# Patient Record
Sex: MALE | Race: WHITE | Employment: FULL TIME | URBAN - NONMETROPOLITAN AREA
[De-identification: names, ages, dates, MRNs, and addresses within clinical notes are randomized per-mention and may not be internally consistent; named-entity substitution may affect disease eponyms.]

---

## 2023-08-22 ENCOUNTER — HOSPITAL ENCOUNTER (INPATIENT)
Age: 53
LOS: 1 days | Discharge: NURSING, REHAB OR LONG-TERM CARE | DRG: 145 | End: 2023-08-24
Attending: DENTIST | Admitting: DENTIST
Payer: MEDICARE

## 2023-08-22 ENCOUNTER — ANESTHESIA (OUTPATIENT)
Dept: OPERATING ROOM | Age: 53
End: 2023-08-22
Payer: MEDICARE

## 2023-08-22 ENCOUNTER — ANESTHESIA EVENT (OUTPATIENT)
Dept: OPERATING ROOM | Age: 53
End: 2023-08-22
Payer: MEDICARE

## 2023-08-22 DIAGNOSIS — K02.9 DECAY, TEETH: Primary | ICD-10-CM

## 2023-08-22 PROCEDURE — 2500000003 HC RX 250 WO HCPCS: Performed by: NURSE ANESTHETIST, CERTIFIED REGISTERED

## 2023-08-22 PROCEDURE — 2709999900 HC NON-CHARGEABLE SUPPLY: Performed by: DENTIST

## 2023-08-22 PROCEDURE — G0378 HOSPITAL OBSERVATION PER HR: HCPCS

## 2023-08-22 PROCEDURE — C9399 UNCLASSIFIED DRUGS OR BIOLOG: HCPCS | Performed by: NURSE ANESTHETIST, CERTIFIED REGISTERED

## 2023-08-22 PROCEDURE — 3600000002 HC SURGERY LEVEL 2 BASE: Performed by: DENTIST

## 2023-08-22 PROCEDURE — 2720000010 HC SURG SUPPLY STERILE: Performed by: DENTIST

## 2023-08-22 PROCEDURE — 2580000003 HC RX 258: Performed by: ANESTHESIOLOGY

## 2023-08-22 PROCEDURE — 2500000003 HC RX 250 WO HCPCS: Performed by: ANESTHESIOLOGY

## 2023-08-22 PROCEDURE — 3700000000 HC ANESTHESIA ATTENDED CARE: Performed by: DENTIST

## 2023-08-22 PROCEDURE — 3700000001 HC ADD 15 MINUTES (ANESTHESIA): Performed by: DENTIST

## 2023-08-22 PROCEDURE — 6360000002 HC RX W HCPCS: Performed by: NURSE ANESTHETIST, CERTIFIED REGISTERED

## 2023-08-22 PROCEDURE — 6360000002 HC RX W HCPCS: Performed by: ANESTHESIOLOGY

## 2023-08-22 PROCEDURE — 6360000002 HC RX W HCPCS: Performed by: DENTIST

## 2023-08-22 PROCEDURE — 7100000001 HC PACU RECOVERY - ADDTL 15 MIN: Performed by: DENTIST

## 2023-08-22 PROCEDURE — 3600000012 HC SURGERY LEVEL 2 ADDTL 15MIN: Performed by: DENTIST

## 2023-08-22 PROCEDURE — 7100000000 HC PACU RECOVERY - FIRST 15 MIN: Performed by: DENTIST

## 2023-08-22 RX ORDER — ROCURONIUM BROMIDE 10 MG/ML
INJECTION, SOLUTION INTRAVENOUS PRN
Status: DISCONTINUED | OUTPATIENT
Start: 2023-08-22 | End: 2023-08-22 | Stop reason: SDUPTHER

## 2023-08-22 RX ORDER — HYDROMORPHONE HYDROCHLORIDE 1 MG/ML
0.5 INJECTION, SOLUTION INTRAMUSCULAR; INTRAVENOUS; SUBCUTANEOUS EVERY 5 MIN PRN
Status: DISCONTINUED | OUTPATIENT
Start: 2023-08-22 | End: 2023-08-22 | Stop reason: HOSPADM

## 2023-08-22 RX ORDER — MIDAZOLAM HYDROCHLORIDE 1 MG/ML
INJECTION INTRAMUSCULAR; INTRAVENOUS PRN
Status: DISCONTINUED | OUTPATIENT
Start: 2023-08-22 | End: 2023-08-22 | Stop reason: SDUPTHER

## 2023-08-22 RX ORDER — HYDROMORPHONE HYDROCHLORIDE 1 MG/ML
0.25 INJECTION, SOLUTION INTRAMUSCULAR; INTRAVENOUS; SUBCUTANEOUS EVERY 5 MIN PRN
Status: DISCONTINUED | OUTPATIENT
Start: 2023-08-22 | End: 2023-08-22 | Stop reason: HOSPADM

## 2023-08-22 RX ORDER — SODIUM CHLORIDE 0.9 % (FLUSH) 0.9 %
5-40 SYRINGE (ML) INJECTION PRN
Status: DISCONTINUED | OUTPATIENT
Start: 2023-08-22 | End: 2023-08-22 | Stop reason: HOSPADM

## 2023-08-22 RX ORDER — SODIUM CHLORIDE 0.9 % (FLUSH) 0.9 %
5-40 SYRINGE (ML) INJECTION EVERY 12 HOURS SCHEDULED
Status: DISCONTINUED | OUTPATIENT
Start: 2023-08-22 | End: 2023-08-22 | Stop reason: HOSPADM

## 2023-08-22 RX ORDER — PROPOFOL 10 MG/ML
INJECTION, EMULSION INTRAVENOUS PRN
Status: DISCONTINUED | OUTPATIENT
Start: 2023-08-22 | End: 2023-08-22 | Stop reason: SDUPTHER

## 2023-08-22 RX ORDER — SODIUM CHLORIDE 9 MG/ML
INJECTION, SOLUTION INTRAVENOUS PRN
Status: DISCONTINUED | OUTPATIENT
Start: 2023-08-22 | End: 2023-08-22 | Stop reason: HOSPADM

## 2023-08-22 RX ORDER — HYDROMORPHONE HYDROCHLORIDE 1 MG/ML
1 INJECTION, SOLUTION INTRAMUSCULAR; INTRAVENOUS; SUBCUTANEOUS
Status: DISCONTINUED | OUTPATIENT
Start: 2023-08-22 | End: 2023-08-24 | Stop reason: HOSPADM

## 2023-08-22 RX ORDER — ONDANSETRON 2 MG/ML
4 INJECTION INTRAMUSCULAR; INTRAVENOUS
Status: DISCONTINUED | OUTPATIENT
Start: 2023-08-22 | End: 2023-08-22 | Stop reason: HOSPADM

## 2023-08-22 RX ORDER — LABETALOL HYDROCHLORIDE 5 MG/ML
10 INJECTION, SOLUTION INTRAVENOUS ONCE
Status: COMPLETED | OUTPATIENT
Start: 2023-08-22 | End: 2023-08-22

## 2023-08-22 RX ORDER — DEXAMETHASONE SODIUM PHOSPHATE 10 MG/ML
INJECTION, SOLUTION INTRAMUSCULAR; INTRAVENOUS PRN
Status: DISCONTINUED | OUTPATIENT
Start: 2023-08-22 | End: 2023-08-22 | Stop reason: SDUPTHER

## 2023-08-22 RX ORDER — HYDROMORPHONE HYDROCHLORIDE 1 MG/ML
0.5 INJECTION, SOLUTION INTRAMUSCULAR; INTRAVENOUS; SUBCUTANEOUS
Status: DISCONTINUED | OUTPATIENT
Start: 2023-08-22 | End: 2023-08-24 | Stop reason: HOSPADM

## 2023-08-22 RX ORDER — LIDOCAINE HYDROCHLORIDE 10 MG/ML
INJECTION, SOLUTION EPIDURAL; INFILTRATION; INTRACAUDAL; PERINEURAL PRN
Status: DISCONTINUED | OUTPATIENT
Start: 2023-08-22 | End: 2023-08-22 | Stop reason: SDUPTHER

## 2023-08-22 RX ORDER — HYDROMORPHONE HYDROCHLORIDE 1 MG/ML
0.25 INJECTION, SOLUTION INTRAMUSCULAR; INTRAVENOUS; SUBCUTANEOUS
Status: DISCONTINUED | OUTPATIENT
Start: 2023-08-22 | End: 2023-08-24 | Stop reason: HOSPADM

## 2023-08-22 RX ORDER — SODIUM CHLORIDE, SODIUM LACTATE, POTASSIUM CHLORIDE, CALCIUM CHLORIDE 600; 310; 30; 20 MG/100ML; MG/100ML; MG/100ML; MG/100ML
INJECTION, SOLUTION INTRAVENOUS CONTINUOUS
Status: DISCONTINUED | OUTPATIENT
Start: 2023-08-22 | End: 2023-08-22 | Stop reason: HOSPADM

## 2023-08-22 RX ORDER — ONDANSETRON 2 MG/ML
INJECTION INTRAMUSCULAR; INTRAVENOUS PRN
Status: DISCONTINUED | OUTPATIENT
Start: 2023-08-22 | End: 2023-08-22 | Stop reason: SDUPTHER

## 2023-08-22 RX ORDER — CLINDAMYCIN PHOSPHATE 600 MG/50ML
600 INJECTION INTRAVENOUS EVERY 6 HOURS
Status: DISCONTINUED | OUTPATIENT
Start: 2023-08-22 | End: 2023-08-24 | Stop reason: HOSPADM

## 2023-08-22 RX ORDER — FENTANYL CITRATE 50 UG/ML
INJECTION, SOLUTION INTRAMUSCULAR; INTRAVENOUS PRN
Status: DISCONTINUED | OUTPATIENT
Start: 2023-08-22 | End: 2023-08-22 | Stop reason: SDUPTHER

## 2023-08-22 RX ADMIN — SODIUM CHLORIDE, SODIUM LACTATE, POTASSIUM CHLORIDE, AND CALCIUM CHLORIDE: 600; 310; 30; 20 INJECTION, SOLUTION INTRAVENOUS at 13:58

## 2023-08-22 RX ADMIN — FENTANYL CITRATE 50 MCG: 0.05 INJECTION, SOLUTION INTRAMUSCULAR; INTRAVENOUS at 17:22

## 2023-08-22 RX ADMIN — FENTANYL CITRATE 50 MCG: 0.05 INJECTION, SOLUTION INTRAMUSCULAR; INTRAVENOUS at 16:35

## 2023-08-22 RX ADMIN — SODIUM CHLORIDE, SODIUM LACTATE, POTASSIUM CHLORIDE, AND CALCIUM CHLORIDE: 600; 310; 30; 20 INJECTION, SOLUTION INTRAVENOUS at 16:44

## 2023-08-22 RX ADMIN — ONDANSETRON 4 MG: 2 INJECTION INTRAMUSCULAR; INTRAVENOUS at 17:54

## 2023-08-22 RX ADMIN — SUGAMMADEX 300 MG: 100 INJECTION, SOLUTION INTRAVENOUS at 17:56

## 2023-08-22 RX ADMIN — MIDAZOLAM 2 MG: 1 INJECTION INTRAMUSCULAR; INTRAVENOUS at 16:18

## 2023-08-22 RX ADMIN — ROCURONIUM BROMIDE 50 MG: 10 INJECTION, SOLUTION INTRAVENOUS at 16:24

## 2023-08-22 RX ADMIN — CLINDAMYCIN IN 5 PERCENT DEXTROSE 600 MG: 12 INJECTION, SOLUTION INTRAVENOUS at 23:05

## 2023-08-22 RX ADMIN — PROPOFOL 200 MG: 10 INJECTION, EMULSION INTRAVENOUS at 16:24

## 2023-08-22 RX ADMIN — LIDOCAINE HYDROCHLORIDE 50 MG: 10 INJECTION, SOLUTION EPIDURAL; INFILTRATION; INTRACAUDAL; PERINEURAL at 16:24

## 2023-08-22 RX ADMIN — SODIUM CHLORIDE, PRESERVATIVE FREE 20 MG: 5 INJECTION INTRAVENOUS at 14:03

## 2023-08-22 RX ADMIN — FENTANYL CITRATE 50 MCG: 0.05 INJECTION, SOLUTION INTRAMUSCULAR; INTRAVENOUS at 16:24

## 2023-08-22 RX ADMIN — DEXAMETHASONE SODIUM PHOSPHATE 10 MG: 10 INJECTION, SOLUTION INTRAMUSCULAR; INTRAVENOUS at 17:20

## 2023-08-22 RX ADMIN — HYDROMORPHONE HYDROCHLORIDE 0.5 MG: 1 INJECTION, SOLUTION INTRAMUSCULAR; INTRAVENOUS; SUBCUTANEOUS at 22:00

## 2023-08-22 RX ADMIN — LABETALOL HYDROCHLORIDE 10 MG: 5 INJECTION, SOLUTION INTRAVENOUS at 18:51

## 2023-08-22 RX ADMIN — FENTANYL CITRATE 50 MCG: 0.05 INJECTION, SOLUTION INTRAMUSCULAR; INTRAVENOUS at 16:56

## 2023-08-22 ASSESSMENT — LIFESTYLE VARIABLES: SMOKING_STATUS: 0

## 2023-08-22 ASSESSMENT — PAIN DESCRIPTION - LOCATION: LOCATION: MOUTH

## 2023-08-22 ASSESSMENT — PAIN SCALES - WONG BAKER: WONGBAKER_NUMERICALRESPONSE: 8

## 2023-08-22 ASSESSMENT — PAIN - FUNCTIONAL ASSESSMENT: PAIN_FUNCTIONAL_ASSESSMENT: 0-10

## 2023-08-22 ASSESSMENT — PAIN DESCRIPTION - PAIN TYPE: TYPE: SURGICAL PAIN

## 2023-08-22 NOTE — OP NOTE
Operative Note      Patient: Jairo Miles  YOB: 1970  MRN: [id number]    Date of Procedure: 8/22/2023    Pre-Op Diagnosis Codes:     * Decay, teeth [K02.9]    Post-Op Diagnosis: Same       Procedure(s):  MULTIPLE DENTAL EXTRACTION ALVEOLAPLASTY    Surgeon(s):  Alexsander Joseph DMD    Assistant:   * No surgical staff found *    Anesthesia: General    Estimated Blood Loss (mL): less than 50     Complications: None    Specimens:   * No specimens in log *    Implants:  * No implants in log *      Drains: * No LDAs found *    Findings:   Generalized tooth decay with gingivitis and friable periodontium. Detailed Description of Procedure:   Full mouth extraction teeth 2 - 15 and 18 - 31. Maxillary and mandibular alveoloplasty.     Electronically signed by Alexsander Joseph DMD on 8/22/2023 at 6:02 PM

## 2023-08-22 NOTE — ANESTHESIA POSTPROCEDURE EVALUATION
Department of Anesthesiology  Postprocedure Note    Patient: Curt Valle  MRN: [id number]  YOB: 1970  Date of evaluation: 8/22/2023      Procedure Summary     Date: 08/22/23 Room / Location: [address]    Anesthesia Start: 1620 Anesthesia Stop:     Procedure: MULTIPLE DENTAL EXTRACTION ALVEOLAPLASTY Diagnosis:       Decay, teeth      (Decay, teeth [K02.9])    Surgeons: Yessenia Rodriguez DMD Responsible Provider: CATRINA Waterman CRNA    Anesthesia Type: general ASA Status: 3          Anesthesia Type: No value filed.     Amanda Phase I: Amanda Score: 9    Amanda Phase II:        Anesthesia Post Evaluation    Patient location during evaluation: PACU  Patient participation: complete - patient participated  Level of consciousness: awake  Pain score: 0  Airway patency: patent  Nausea & Vomiting: no nausea and no vomiting  Complications: no  Cardiovascular status: hemodynamically stable  Respiratory status: acceptable, face mask, nasal airway and spontaneous ventilation  Hydration status: euvolemic  Pain management: adequate

## 2023-08-22 NOTE — ANESTHESIA PRE PROCEDURE
Department of Anesthesiology  Preprocedure Note       Name:  Rod Varela   Age:  48 y.o.  :  1970                                          MRN:  [id number]         Date:  2023      Surgeon: Karol Hull):  Greer Lefort, DMD    Procedure: Procedure(s):  MULTIPLE DENTAL EXTRACTION    Medications prior to admission:   Prior to Admission medications    Medication Sig Start Date End Date Taking? Authorizing Provider   acetaminophen (TYLENOL) 500 MG tablet Take 1 tablet by mouth in the morning and 1 tablet at noon and 1 tablet in the evening. Historical Provider, MD   Amantadine (SYMMETREL) 100 MG TABS tablet Take 150 mg by mouth 2 times daily Indications: Neurostimulant Give 1/5 tablet by mouth two times a day    Historical Provider, MD   amLODIPine (NORVASC) 5 MG tablet Take 1 tablet by mouth daily    Historical Provider, MD   baclofen (LIORESAL) 20 MG tablet Take 1 tablet by mouth 4 times daily Indications: Muscle Spasm    Historical Provider, MD   menthol-zinc oxide (CALMOSEPTINE) 0.44-20.625 % OINT ointment Apply topically 3 times daily as needed Max 30 ml per day.     Historical Provider, MD   carboxymethylcellulose 1 % ophthalmic solution Place 1 drop into both eyes every 2 hours as needed for Dry Eyes    Historical Provider, MD   carvedilol (COREG) 6.25 MG tablet Take 1 tablet by mouth 2 times daily (with meals) Indications: High Blood Pressure Disorder    Historical Provider, MD   cloNIDine (CATAPRES) 0.1 MG tablet Take 1 tablet by mouth every 6 hours as needed for High Blood Pressure Give 1 tablet by mouth every 6 hours as needed for hypertension SBP greater than 008 and diastolic Blood Pressure greater than 105    Historical Provider, MD   clopidogrel (PLAVIX) 75 MG tablet Take 1 tablet by mouth daily    Historical Provider, MD   DULoxetine (CYMBALTA) 60 MG extended release capsule Take 1 capsule by mouth 2 times daily    Historical Provider, MD   FLUoxetine (PROZAC) 20 MG capsule Take 1 capsule by

## 2023-08-23 PROBLEM — K02.9 DECAY, TEETH: Status: ACTIVE | Noted: 2023-08-23

## 2023-08-23 LAB
ANION GAP SERPL CALCULATED.3IONS-SCNC: 12 MMOL/L (ref 7–19)
BASOPHILS # BLD: 0.1 K/UL (ref 0–0.2)
BASOPHILS NFR BLD: 0.2 % (ref 0–1)
BUN SERPL-MCNC: 9 MG/DL (ref 6–20)
CALCIUM SERPL-MCNC: 9.5 MG/DL (ref 8.6–10)
CHLORIDE SERPL-SCNC: 96 MMOL/L (ref 98–111)
CO2 SERPL-SCNC: 26 MMOL/L (ref 22–29)
CREAT SERPL-MCNC: 0.7 MG/DL (ref 0.5–1.2)
EOSINOPHIL # BLD: 0 K/UL (ref 0–0.6)
EOSINOPHIL NFR BLD: 0 % (ref 0–5)
ERYTHROCYTE [DISTWIDTH] IN BLOOD BY AUTOMATED COUNT: 13.7 % (ref 11.5–14.5)
GLUCOSE BLD-MCNC: 161 MG/DL (ref 70–99)
GLUCOSE BLD-MCNC: 168 MG/DL (ref 70–99)
GLUCOSE BLD-MCNC: 173 MG/DL (ref 70–99)
GLUCOSE BLD-MCNC: 208 MG/DL (ref 70–99)
GLUCOSE SERPL-MCNC: 183 MG/DL (ref 74–109)
HBA1C MFR BLD: 7.1 % (ref 4–6)
HCT VFR BLD AUTO: 45.1 % (ref 42–52)
HGB BLD-MCNC: 15.1 G/DL (ref 14–18)
IMM GRANULOCYTES # BLD: 0.1 K/UL
LYMPHOCYTES # BLD: 1.3 K/UL (ref 1.1–4.5)
LYMPHOCYTES NFR BLD: 6.4 % (ref 20–40)
MCH RBC QN AUTO: 29.5 PG (ref 27–31)
MCHC RBC AUTO-ENTMCNC: 33.5 G/DL (ref 33–37)
MCV RBC AUTO: 88.1 FL (ref 80–94)
MONOCYTES # BLD: 1.3 K/UL (ref 0–0.9)
MONOCYTES NFR BLD: 6.3 % (ref 0–10)
NEUTROPHILS # BLD: 18 K/UL (ref 1.5–7.5)
NEUTS SEG NFR BLD: 86.6 % (ref 50–65)
PERFORMED ON: ABNORMAL
PLATELET # BLD AUTO: 371 K/UL (ref 130–400)
PMV BLD AUTO: 9.2 FL (ref 9.4–12.4)
POTASSIUM SERPL-SCNC: 4.4 MMOL/L (ref 3.5–5)
RBC # BLD AUTO: 5.12 M/UL (ref 4.7–6.1)
SODIUM SERPL-SCNC: 134 MMOL/L (ref 136–145)
WBC # BLD AUTO: 20.9 K/UL (ref 4.8–10.8)

## 2023-08-23 PROCEDURE — 80048 BASIC METABOLIC PNL TOTAL CA: CPT

## 2023-08-23 PROCEDURE — 36415 COLL VENOUS BLD VENIPUNCTURE: CPT

## 2023-08-23 PROCEDURE — 96376 TX/PRO/DX INJ SAME DRUG ADON: CPT

## 2023-08-23 PROCEDURE — 94760 N-INVAS EAR/PLS OXIMETRY 1: CPT

## 2023-08-23 PROCEDURE — 0CDWXZ1 EXTRACTION OF UPPER TOOTH, MULTIPLE, EXTERNAL APPROACH: ICD-10-PCS | Performed by: DENTIST

## 2023-08-23 PROCEDURE — 1210000000 HC MED SURG R&B

## 2023-08-23 PROCEDURE — 0NQT0ZZ REPAIR RIGHT MANDIBLE, OPEN APPROACH: ICD-10-PCS | Performed by: DENTIST

## 2023-08-23 PROCEDURE — 6370000000 HC RX 637 (ALT 250 FOR IP)

## 2023-08-23 PROCEDURE — 0NQR0ZZ REPAIR MAXILLA, OPEN APPROACH: ICD-10-PCS | Performed by: DENTIST

## 2023-08-23 PROCEDURE — 6360000002 HC RX W HCPCS: Performed by: DENTIST

## 2023-08-23 PROCEDURE — 96375 TX/PRO/DX INJ NEW DRUG ADDON: CPT

## 2023-08-23 PROCEDURE — 96365 THER/PROPH/DIAG IV INF INIT: CPT

## 2023-08-23 PROCEDURE — 82962 GLUCOSE BLOOD TEST: CPT

## 2023-08-23 PROCEDURE — 0NQV0ZZ REPAIR LEFT MANDIBLE, OPEN APPROACH: ICD-10-PCS | Performed by: DENTIST

## 2023-08-23 PROCEDURE — 6370000000 HC RX 637 (ALT 250 FOR IP): Performed by: DENTIST

## 2023-08-23 PROCEDURE — 85025 COMPLETE CBC W/AUTO DIFF WBC: CPT

## 2023-08-23 PROCEDURE — 83036 HEMOGLOBIN GLYCOSYLATED A1C: CPT

## 2023-08-23 RX ORDER — PANTOPRAZOLE SODIUM 40 MG/1
40 TABLET, DELAYED RELEASE ORAL
Status: DISCONTINUED | OUTPATIENT
Start: 2023-08-23 | End: 2023-08-24 | Stop reason: HOSPADM

## 2023-08-23 RX ORDER — DULOXETIN HYDROCHLORIDE 60 MG/1
60 CAPSULE, DELAYED RELEASE ORAL 2 TIMES DAILY
Status: DISCONTINUED | OUTPATIENT
Start: 2023-08-23 | End: 2023-08-24 | Stop reason: HOSPADM

## 2023-08-23 RX ORDER — AMLODIPINE BESYLATE 5 MG/1
5 TABLET ORAL DAILY
Status: DISCONTINUED | OUTPATIENT
Start: 2023-08-23 | End: 2023-08-24 | Stop reason: HOSPADM

## 2023-08-23 RX ORDER — AMANTADINE HYDROCHLORIDE 100 MG/1
150 TABLET ORAL 2 TIMES DAILY
Status: DISCONTINUED | OUTPATIENT
Start: 2023-08-23 | End: 2023-08-23 | Stop reason: RX

## 2023-08-23 RX ORDER — TIZANIDINE 4 MG/1
4 TABLET ORAL 3 TIMES DAILY
Status: DISCONTINUED | OUTPATIENT
Start: 2023-08-23 | End: 2023-08-24 | Stop reason: HOSPADM

## 2023-08-23 RX ORDER — DEXTROSE MONOHYDRATE 100 MG/ML
INJECTION, SOLUTION INTRAVENOUS CONTINUOUS PRN
Status: DISCONTINUED | OUTPATIENT
Start: 2023-08-23 | End: 2023-08-24 | Stop reason: HOSPADM

## 2023-08-23 RX ORDER — ROSUVASTATIN CALCIUM 20 MG/1
20 TABLET, COATED ORAL DAILY
Status: DISCONTINUED | OUTPATIENT
Start: 2023-08-23 | End: 2023-08-24 | Stop reason: HOSPADM

## 2023-08-23 RX ORDER — CHLORHEXIDINE GLUCONATE 0.12 MG/ML
15 RINSE ORAL 2 TIMES DAILY
Status: DISCONTINUED | OUTPATIENT
Start: 2023-08-23 | End: 2023-08-24 | Stop reason: HOSPADM

## 2023-08-23 RX ORDER — INSULIN LISPRO 100 [IU]/ML
0-4 INJECTION, SOLUTION INTRAVENOUS; SUBCUTANEOUS NIGHTLY
Status: DISCONTINUED | OUTPATIENT
Start: 2023-08-23 | End: 2023-08-24 | Stop reason: HOSPADM

## 2023-08-23 RX ORDER — CARBOXYMETHYLCELLULOSE SODIUM 5 MG/ML
1 SOLUTION/ DROPS OPHTHALMIC
Status: DISCONTINUED | OUTPATIENT
Start: 2023-08-23 | End: 2023-08-24 | Stop reason: HOSPADM

## 2023-08-23 RX ORDER — CARVEDILOL 6.25 MG/1
6.25 TABLET ORAL 2 TIMES DAILY WITH MEALS
Status: DISCONTINUED | OUTPATIENT
Start: 2023-08-23 | End: 2023-08-24 | Stop reason: HOSPADM

## 2023-08-23 RX ORDER — INSULIN LISPRO 100 [IU]/ML
0-4 INJECTION, SOLUTION INTRAVENOUS; SUBCUTANEOUS
Status: DISCONTINUED | OUTPATIENT
Start: 2023-08-23 | End: 2023-08-24 | Stop reason: HOSPADM

## 2023-08-23 RX ORDER — SENNOSIDES A AND B 8.6 MG/1
1 TABLET, FILM COATED ORAL DAILY
Status: DISCONTINUED | OUTPATIENT
Start: 2023-08-23 | End: 2023-08-24 | Stop reason: HOSPADM

## 2023-08-23 RX ORDER — GABAPENTIN 300 MG/1
300 CAPSULE ORAL 3 TIMES DAILY
Status: DISCONTINUED | OUTPATIENT
Start: 2023-08-23 | End: 2023-08-24 | Stop reason: HOSPADM

## 2023-08-23 RX ORDER — MELATONIN 10 MG
10 CAPSULE ORAL NIGHTLY
Status: DISCONTINUED | OUTPATIENT
Start: 2023-08-23 | End: 2023-08-24 | Stop reason: HOSPADM

## 2023-08-23 RX ORDER — AMANTADINE HYDROCHLORIDE 100 MG/1
100 CAPSULE, GELATIN COATED ORAL 3 TIMES DAILY
Status: DISCONTINUED | OUTPATIENT
Start: 2023-08-23 | End: 2023-08-24 | Stop reason: HOSPADM

## 2023-08-23 RX ORDER — CLOPIDOGREL BISULFATE 75 MG/1
75 TABLET ORAL DAILY
Status: DISCONTINUED | OUTPATIENT
Start: 2023-08-23 | End: 2023-08-24 | Stop reason: HOSPADM

## 2023-08-23 RX ADMIN — AMANTADINE HYDROCHLORIDE 100 MG: 100 CAPSULE, LIQUID FILLED ORAL at 14:55

## 2023-08-23 RX ADMIN — TRAZODONE HYDROCHLORIDE 150 MG: 50 TABLET ORAL at 21:45

## 2023-08-23 RX ADMIN — HYDROMORPHONE HYDROCHLORIDE 1 MG: 1 INJECTION, SOLUTION INTRAMUSCULAR; INTRAVENOUS; SUBCUTANEOUS at 07:16

## 2023-08-23 RX ADMIN — TIZANIDINE 4 MG: 4 TABLET ORAL at 21:45

## 2023-08-23 RX ADMIN — HYDROMORPHONE HYDROCHLORIDE 1 MG: 1 INJECTION, SOLUTION INTRAMUSCULAR; INTRAVENOUS; SUBCUTANEOUS at 20:13

## 2023-08-23 RX ADMIN — CARVEDILOL 6.25 MG: 6.25 TABLET, FILM COATED ORAL at 16:51

## 2023-08-23 RX ADMIN — ROSUVASTATIN CALCIUM 20 MG: 20 TABLET, COATED ORAL at 09:35

## 2023-08-23 RX ADMIN — DULOXETINE HYDROCHLORIDE 60 MG: 60 CAPSULE, DELAYED RELEASE ORAL at 21:45

## 2023-08-23 RX ADMIN — GABAPENTIN 300 MG: 300 CAPSULE ORAL at 09:35

## 2023-08-23 RX ADMIN — Medication 10 MG: at 21:45

## 2023-08-23 RX ADMIN — HYDROMORPHONE HYDROCHLORIDE 1 MG: 1 INJECTION, SOLUTION INTRAMUSCULAR; INTRAVENOUS; SUBCUTANEOUS at 10:27

## 2023-08-23 RX ADMIN — CLINDAMYCIN IN 5 PERCENT DEXTROSE 600 MG: 12 INJECTION, SOLUTION INTRAVENOUS at 04:08

## 2023-08-23 RX ADMIN — DULOXETINE HYDROCHLORIDE 60 MG: 60 CAPSULE, DELAYED RELEASE ORAL at 09:35

## 2023-08-23 RX ADMIN — TIZANIDINE 4 MG: 4 TABLET ORAL at 09:35

## 2023-08-23 RX ADMIN — CLINDAMYCIN IN 5 PERCENT DEXTROSE 600 MG: 12 INJECTION, SOLUTION INTRAVENOUS at 21:57

## 2023-08-23 RX ADMIN — GABAPENTIN 300 MG: 300 CAPSULE ORAL at 21:44

## 2023-08-23 RX ADMIN — HYDROMORPHONE HYDROCHLORIDE 1 MG: 1 INJECTION, SOLUTION INTRAMUSCULAR; INTRAVENOUS; SUBCUTANEOUS at 01:16

## 2023-08-23 RX ADMIN — AMANTADINE HYDROCHLORIDE 100 MG: 100 CAPSULE, LIQUID FILLED ORAL at 09:35

## 2023-08-23 RX ADMIN — TIZANIDINE 4 MG: 4 TABLET ORAL at 14:54

## 2023-08-23 RX ADMIN — HYDROMORPHONE HYDROCHLORIDE 1 MG: 1 INJECTION, SOLUTION INTRAMUSCULAR; INTRAVENOUS; SUBCUTANEOUS at 04:06

## 2023-08-23 RX ADMIN — GABAPENTIN 300 MG: 300 CAPSULE ORAL at 14:54

## 2023-08-23 RX ADMIN — PANTOPRAZOLE SODIUM 40 MG: 40 TABLET, DELAYED RELEASE ORAL at 09:35

## 2023-08-23 RX ADMIN — CARVEDILOL 6.25 MG: 6.25 TABLET, FILM COATED ORAL at 09:35

## 2023-08-23 RX ADMIN — CHLORHEXIDINE GLUCONATE 15 ML: 1.2 RINSE ORAL at 21:50

## 2023-08-23 RX ADMIN — AMANTADINE HYDROCHLORIDE 100 MG: 100 CAPSULE, LIQUID FILLED ORAL at 21:44

## 2023-08-23 RX ADMIN — CLINDAMYCIN IN 5 PERCENT DEXTROSE 600 MG: 12 INJECTION, SOLUTION INTRAVENOUS at 10:29

## 2023-08-23 RX ADMIN — CLINDAMYCIN IN 5 PERCENT DEXTROSE 600 MG: 12 INJECTION, SOLUTION INTRAVENOUS at 16:53

## 2023-08-23 RX ADMIN — AMLODIPINE BESYLATE 5 MG: 5 TABLET ORAL at 09:35

## 2023-08-23 RX ADMIN — SENNOSIDES 8.6 MG: 8.6 TABLET, FILM COATED ORAL at 09:35

## 2023-08-23 ASSESSMENT — PAIN DESCRIPTION - DESCRIPTORS: DESCRIPTORS: ACHING

## 2023-08-23 ASSESSMENT — PAIN DESCRIPTION - LOCATION: LOCATION: MOUTH

## 2023-08-23 ASSESSMENT — PAIN SCALES - GENERAL: PAINLEVEL_OUTOF10: 8

## 2023-08-23 NOTE — CONSULTS
Comprehensive Nutrition Assessment    Type and Reason for Visit:  Initial, Consult    Nutrition Recommendations/Plan:   Start ONS, follow for po intake     Malnutrition Assessment:  Malnutrition Status:  No malnutrition (08/23/23 1413)    Context:  Acute Illness     Findings of the 6 clinical characteristics of malnutrition:  Energy Intake:  Mild decrease in energy intake (Comment)  Weight Loss:  No significant weight loss     Body Fat Loss:  No significant body fat loss     Muscle Mass Loss:  No significant muscle mass loss    Fluid Accumulation:  Mild  (facial--d/t surgery)   Strength:  Not Performed    Nutrition Assessment:    Received consult for oral supplements. Aware pt's diet has been advanced to full liquids. Multiple teeth extraction with difficulty chewing. Aware Glucose and Accuchek's are elevated 183/168 respectfully--no hx of DM. Will start 2 ONS--Magic cup (cold may feel good) and Glucerna (a bit less sugar with elevated labs)    Nutrition Related Findings:    Glucose 183   Accuchek's 168 Wound Type: Multiple, Surgical Incision       Current Nutrition Intake & Therapies:    Average Meal Intake: Unable to assess  Average Supplements Intake: Unable to assess  ADULT DIET; Full Liquid  ADULT ORAL NUTRITION SUPPLEMENT; Lunch, Dinner; Frozen Oral Supplement  ADULT ORAL NUTRITION SUPPLEMENT; Breakfast, Lunch, Dinner; Diabetic Oral Supplement    Anthropometric Measures:  Height: 5' 11\" (180.3 cm)  Ideal Body Weight (IBW): 172 lbs (78 kg)    Admission Body Weight: 238 lb (108 kg)  Current Body Weight: 238 lb (108 kg), 138.4 % IBW. Weight Source: Stated  Current BMI (kg/m2): 33.2  Usual Body Weight: 258 lb (117 kg) (12/2022)  % Weight Change (Calculated): -7.8  BMI Categories: Obese Class 1 (BMI 30.0-34. 9)    Estimated Daily Nutrient Needs:  Energy Requirements Based On: Kcal/kg  Weight Used for Energy Requirements: Current  Energy (kcal/day): 8686-7439 kcals  Weight Used for Protein Requirements: Ideal  Protein (g/day): 94-156g  Method Used for Fluid Requirements: 1 ml/kcal  Fluid (ml/day): 4985-9953 ml    Nutrition Diagnosis:   Inadequate oral intake related to biting/chewing (masticatory) difficulty, increase demand for energy/nutrients as evidenced by wounds, weight loss, poor dentition    Nutrition Interventions:   Food and/or Nutrient Delivery: Continue Current Diet, Start Oral Nutrition Supplement  Nutrition Education/Counseling: No recommendation at this time  Coordination of Nutrition Care: Continue to monitor while inpatient       Goals:     Goals: Meet at least 75% of estimated needs, PO intake 50% or greater       Nutrition Monitoring and Evaluation:   Behavioral-Environmental Outcomes: None Identified  Food/Nutrient Intake Outcomes: Food and Nutrient Intake, Supplement Intake  Physical Signs/Symptoms Outcomes: Biochemical Data, Chewing or Swallowing, Fluid Status or Edema, Weight, Skin    Discharge Planning:     Too soon to determine     Cheyanne Gutierrez MS, RD, LD  Contact: [phone]

## 2023-08-23 NOTE — OP NOTE
[facility] [employer] [facility] [address], [address] [place] [address]                                OPERATIVE REPORT    PATIENT NAME: Irvin Mtz                     :        1970  MED REC NO:   [id number]                              ROOM:       [facility]  ACCOUNT NO:   [de-identified]                           ADMIT DATE: 2023  PROVIDER:     Reese Littlejohn DMD    DATE OF PROCEDURE:  2023    PREOPERATIVE DIAGNOSES:  1. Deteriorating maxillary, mandibular dentition due to decay. 2.  Maxillary and mandibular alveolar, labial and lingual exostosis. POSTOPERATIVE DIAGNOSES:  1. Deteriorating maxillary, mandibular dentition due to decay. 2.  Maxillary and mandibular alveolar, labial and lingual exostosis. PROCEDURE PERFORMED:  1. Full mouth extraction of teeth numbers 2 through 15 and 18 through  31.  2.  Maxillary and mandibular right and left alveoloplasty. SURGEON:  Sarita Gay DMD    ANESTHESIA:  General endotracheal anesthesia. BRIEF HISTORY:  This is a 59-year-old white male who is incapacitated  due to three strokes. The patient presented to [facility]  [facility]ral [facility]ill[facility] for evaluation with complaint of multiple  toothaches. Upon evaluation, it was discovered that the patient had  severe decay of his remaining dentition and required extraction or  removal under general anesthetic as an inpatient or outpatient at  [address] [place] [address]:  The patient was escorted to OR [address] and  anesthesia was administered smoothly. The patient was prepped and  draped in the usual fashion for oral and maxillofacial surgery. A  throat pack was placed. Approximately 10 mL of 4% articaine with  1:200,000 epinephrine were administered for bilateral maxillary and  mandibular infiltration and nerve block.   Approximately, 5 mL of 0.5%  Marcaine with 1:200,000 epinephrine were administered for bilateral  maxillary, mandibular infiltration and nerve block. Attention was first  given to the left side and a full-thickness mucoperiosteal envelope flap  was reflected on the maxillary and mandibular left arch. Ostectomy was  then accomplished on the maxillary, mandibular left alveolar ridge using  Guevara drill, teeth numbers 9, 10, 11, 12, 13, 14, 15, 18, 19, 20, 21, 22,  23 and 24 were then surgically removed using straight elevator. A 150  and 151 forceps for the incisors and premolars and maxillary molars, and  a 23 forceps for the mandibular molars. Guevara drill was then used for  maxillary and mandibular left alveoloplasty as well as exostosis  reduction. The oral cavity was irrigated copiously and a continuous 3-0  chromic gut suture was placed x1 on the maxillary arch and x1 on the  mandibular arch. Attention was then given to the right side and a  full-thickness mucoperiosteal envelope flap was reflected on the  maxillary mandibular right alveolar arch. Ostectomy was accomplished in  the maxillary, mandibular right arch using a Guevara drill and teeth  numbers 2, 3, 4, 5, 6, 7, 8, 25, 26, 27, 28, 29, 30 and 31 were  surgically removed using straight elevator, 150 and 151 forceps as well  as 23 forceps. Maxillary, mandibular right alveoloplasty was  accomplished using Guevara drill and the wounds were irrigated copiously. Closure was accomplished using 3-0 chromic gut in a continuous fashion  x1 on the maxillary arch and one on the mandibular arch. The oral  cavity was irrigated copiously and suctioned, and the throat pack  removed. ESTIMATED BLOOD LOSS:  Approximately 50 mL. FLUIDS:  Approximately 1000 mL of lactated Ringer's. DISPOSITION:  The patient was escorted to the recovery room, extubated  and breathing on his own.         Migel Antunez DMD    D: 08/22/2023 19:51:18      T: 08/22/2023 19:54:20     [email or web address]  Job#: [id number]     Doc#: [id number]    CC:

## 2023-08-23 NOTE — CONSULTS
[facility] - Consult      Patient:  Liberty Moctezuma  YOB: 1970  Date of Service: 8/23/2023  MRN: [id number]   Acct: [de-identified]   Primary Care Physician: Jeremiah Martinez  Advance Directive: No Order  Admit Date: 8/22/2023       Hospital Day: 0  Consult requested by: Dave Resendiz DMD  Reason for consult: medical management    CHIEF COMPLAINT: multiple tooth extractions    History Of Present Illness: The patient is a 48 y.o. 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 Hai Soler for which hospitalist were consulted for medical management. Patient was tachycardic and hypertensive this morning. Home medications resumed including Coreg and amlodipine. He is currently tolerating a clear liquid diet with nutritional supplements. He is continuing to bleed following his tooth extractions, therefore his Plavix is currently being held. Clindamycin 600 mg every 6 hours continues. Will consider discharge on 8/24.     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 He is ill-appearing. HENT:      Mouth/Throat:      Comments: Bloody gauze noted in mouth  Eyes:      Pupils: Pupils are equal, round, and reactive to light. Cardiovascular:      Rate and Rhythm: Regular rhythm. Tachycardia present. Pulses: Normal pulses. Pulmonary:      Effort: Pulmonary effort is normal.      Breath sounds: Normal breath sounds. Abdominal:      General: Abdomen is flat. Bowel sounds are normal. There is no distension. Palpations: Abdomen is soft. Tenderness: There is no abdominal tenderness. Skin:     General: Skin is warm and dry. Neurological:      Mental Status: He is alert. Mental status is at baseline. Motor: Weakness present. Comments: LUE flaccid, BLE flaccid, foot drop noted bilaterally, baseline  Normal  strength RUE  Cognitive deficit, baseline        Medications:      dextrose        amLODIPine  5 mg Oral Daily    carvedilol  6.25 mg Oral BID WC    [Held by provider] clopidogrel  75 mg Oral Daily    DULoxetine  60 mg Oral BID    gabapentin  300 mg Oral TID    melatonin  10 mg Oral Nightly    pantoprazole  40 mg Oral QAM AC    rosuvastatin  20 mg Oral Daily    senna  1 tablet Oral Daily    tiZANidine  4 mg Oral TID    traZODone  150 mg Oral Nightly    insulin lispro  0-4 Units SubCUTAneous TID WC    insulin lispro  0-4 Units SubCUTAneous Nightly    amantadine  100 mg Oral TID    clindamycin (CLEOCIN) IV  600 mg IntraVENous Q6H     carboxymethylcellulose, glucose, dextrose bolus **OR** dextrose bolus, glucagon (rDNA), dextrose, HYDROmorphone, HYDROmorphone **OR** HYDROmorphone  ADULT DIET;  Full Liquid  ADULT ORAL NUTRITION SUPPLEMENT; Lunch, Dinner; Frozen Oral Supplement  ADULT ORAL NUTRITION

## 2023-08-23 NOTE — PROGRESS NOTES
4 Eyes Skin Assessment     NAME:  Liberty Moctezuma  YOB: 1970  MEDICAL RECORD NUMBER:  [id number]    The patient is being assessed for  Post-Op Surgical Admission    I agree that at least one RN has performed a thorough Head to Toe Skin Assessment on the patient. ALL assessment sites listed below have been assessed. Areas assessed by both nurses:    Head, Face, Ears, Shoulders, Back, Chest, Arms, Elbows, Hands, Sacrum. Buttock, Coccyx, Ischium, Legs. Feet and Heels, and Under Medical Devices         Does the Patient have a Wound?  No noted wound(s)       Joshua Prevention initiated by RN: Yes  Wound Care Orders initiated by RN: No    Pressure Injury (Stage 3,4, Unstageable, DTI, NWPT, and Complex wounds) if present, place Wound referral order by RN under : No    New Ostomies, if present place, Ostomy referral order under : No     Nurse 1 eSignature: Electronically signed by Jaime Rodriguez RN on 8/23/23 at 12:54 AM CDT    **SHARE this note so that the co-signing nurse can place an eSignature**    Nurse 2 eSignature: Electronically signed by Angel Mcdermott RN on 8/23/2023 at 1:25 AM

## 2023-08-23 NOTE — CARE COORDINATION
IMM Letter given to patient's spouse. Reviewed, discussed, answered questions, and signed by patient's spouse. Signed copy placed in patient's chart.      08/23/23 1418   IMM Letter   IMM Letter given to Patient/Family/Significant other/Guardian/POA/by: given to patient's spouse by Catina Barbour RN BSN    IMM Letter date given: 08/23/23   IMM Letter time given: 1420     Electronically signed by Catina Barbour RN, BSN on 8/23/2023 at 2:20 PM

## 2023-08-23 NOTE — PROGRESS NOTES
Charla Santiago arrived to room # [id number]. Presented with: s/p full mouth dental extraction    Vitals:    08/22/23 2300   BP: (!) 148/113   Pulse: (!) 102   Resp: 20   Temp:    SpO2: 97%     Patient safety contract and falls prevention contract reviewed with patient Yes. Oriented Patient and Family to room. Wife at bedside, with all of patient's belongings including wheelchair. Call light within reach. Yes.   Needs, issues or concerns expressed at this time: no.      Electronically signed by Billy Sumner RN on 8/23/2023 at 12:51 AM 12

## 2023-08-24 VITALS
HEIGHT: 71 IN | DIASTOLIC BLOOD PRESSURE: 62 MMHG | BODY MASS INDEX: 33.32 KG/M2 | SYSTOLIC BLOOD PRESSURE: 100 MMHG | HEART RATE: 116 BPM | WEIGHT: 238 LBS | OXYGEN SATURATION: 92 % | RESPIRATION RATE: 20 BRPM | TEMPERATURE: 97.9 F

## 2023-08-24 LAB
ANION GAP SERPL CALCULATED.3IONS-SCNC: 14 MMOL/L (ref 7–19)
BASOPHILS # BLD: 0.1 K/UL (ref 0–0.2)
BASOPHILS NFR BLD: 0.5 % (ref 0–1)
BUN SERPL-MCNC: 13 MG/DL (ref 6–20)
CALCIUM SERPL-MCNC: 9.7 MG/DL (ref 8.6–10)
CHLORIDE SERPL-SCNC: 96 MMOL/L (ref 98–111)
CO2 SERPL-SCNC: 26 MMOL/L (ref 22–29)
CREAT SERPL-MCNC: 0.6 MG/DL (ref 0.5–1.2)
EKG P AXIS: 46 DEGREES
EKG P-R INTERVAL: 178 MS
EKG Q-T INTERVAL: 324 MS
EKG QRS DURATION: 98 MS
EKG QTC CALCULATION (BAZETT): 422 MS
EKG T AXIS: 31 DEGREES
EOSINOPHIL # BLD: 0 K/UL (ref 0–0.6)
EOSINOPHIL NFR BLD: 0 % (ref 0–5)
ERYTHROCYTE [DISTWIDTH] IN BLOOD BY AUTOMATED COUNT: 14.4 % (ref 11.5–14.5)
GLUCOSE BLD-MCNC: 173 MG/DL (ref 70–99)
GLUCOSE BLD-MCNC: 201 MG/DL (ref 70–99)
GLUCOSE SERPL-MCNC: 183 MG/DL (ref 74–109)
HCT VFR BLD AUTO: 45.4 % (ref 42–52)
HGB BLD-MCNC: 14.9 G/DL (ref 14–18)
IMM GRANULOCYTES # BLD: 0.1 K/UL
LYMPHOCYTES # BLD: 2.5 K/UL (ref 1.1–4.5)
LYMPHOCYTES NFR BLD: 17.6 % (ref 20–40)
MCH RBC QN AUTO: 29.9 PG (ref 27–31)
MCHC RBC AUTO-ENTMCNC: 32.8 G/DL (ref 33–37)
MCV RBC AUTO: 91 FL (ref 80–94)
MONOCYTES # BLD: 1.6 K/UL (ref 0–0.9)
MONOCYTES NFR BLD: 11 % (ref 0–10)
NEUTROPHILS # BLD: 10.1 K/UL (ref 1.5–7.5)
NEUTS SEG NFR BLD: 70.2 % (ref 50–65)
PERFORMED ON: ABNORMAL
PERFORMED ON: ABNORMAL
PLATELET # BLD AUTO: 298 K/UL (ref 130–400)
PMV BLD AUTO: 9.9 FL (ref 9.4–12.4)
POTASSIUM SERPL-SCNC: 4.3 MMOL/L (ref 3.5–5)
RBC # BLD AUTO: 4.99 M/UL (ref 4.7–6.1)
SODIUM SERPL-SCNC: 136 MMOL/L (ref 136–145)
WBC # BLD AUTO: 14.4 K/UL (ref 4.8–10.8)

## 2023-08-24 PROCEDURE — 80048 BASIC METABOLIC PNL TOTAL CA: CPT

## 2023-08-24 PROCEDURE — 6370000000 HC RX 637 (ALT 250 FOR IP)

## 2023-08-24 PROCEDURE — 36415 COLL VENOUS BLD VENIPUNCTURE: CPT

## 2023-08-24 PROCEDURE — 82962 GLUCOSE BLOOD TEST: CPT

## 2023-08-24 PROCEDURE — 93005 ELECTROCARDIOGRAM TRACING: CPT

## 2023-08-24 PROCEDURE — 85025 COMPLETE CBC W/AUTO DIFF WBC: CPT

## 2023-08-24 PROCEDURE — 6360000002 HC RX W HCPCS: Performed by: DENTIST

## 2023-08-24 RX ORDER — CLINDAMYCIN HYDROCHLORIDE 300 MG/1
300 CAPSULE ORAL 3 TIMES DAILY
Qty: 21 CAPSULE | Refills: 0 | DISCHARGE
Start: 2023-08-24 | End: 2023-08-31

## 2023-08-24 RX ORDER — GABAPENTIN 300 MG/1
300 CAPSULE ORAL 3 TIMES DAILY
Qty: 30 CAPSULE | Refills: 0 | Status: SHIPPED | OUTPATIENT
Start: 2023-08-24 | End: 2023-09-03

## 2023-08-24 RX ORDER — CHLORHEXIDINE GLUCONATE 0.12 MG/ML
15 RINSE ORAL 2 TIMES DAILY
Qty: 420 ML | Refills: 0 | DISCHARGE
Start: 2023-08-24 | End: 2023-09-07

## 2023-08-24 RX ADMIN — TIZANIDINE 4 MG: 4 TABLET ORAL at 15:14

## 2023-08-24 RX ADMIN — INSULIN LISPRO 1 UNITS: 100 INJECTION, SOLUTION INTRAVENOUS; SUBCUTANEOUS at 13:03

## 2023-08-24 RX ADMIN — AMLODIPINE BESYLATE 5 MG: 5 TABLET ORAL at 08:27

## 2023-08-24 RX ADMIN — GABAPENTIN 300 MG: 300 CAPSULE ORAL at 15:14

## 2023-08-24 RX ADMIN — CLINDAMYCIN IN 5 PERCENT DEXTROSE 600 MG: 12 INJECTION, SOLUTION INTRAVENOUS at 08:49

## 2023-08-24 RX ADMIN — CLOPIDOGREL BISULFATE 75 MG: 75 TABLET ORAL at 13:02

## 2023-08-24 RX ADMIN — HYDROMORPHONE HYDROCHLORIDE 0.5 MG: 1 INJECTION, SOLUTION INTRAMUSCULAR; INTRAVENOUS; SUBCUTANEOUS at 04:31

## 2023-08-24 RX ADMIN — DULOXETINE HYDROCHLORIDE 60 MG: 60 CAPSULE, DELAYED RELEASE ORAL at 08:27

## 2023-08-24 RX ADMIN — SENNOSIDES 8.6 MG: 8.6 TABLET, FILM COATED ORAL at 08:27

## 2023-08-24 RX ADMIN — HYDROMORPHONE HYDROCHLORIDE 0.5 MG: 1 INJECTION, SOLUTION INTRAMUSCULAR; INTRAVENOUS; SUBCUTANEOUS at 08:38

## 2023-08-24 RX ADMIN — CHLORHEXIDINE GLUCONATE 15 ML: 1.2 RINSE ORAL at 08:27

## 2023-08-24 RX ADMIN — AMANTADINE HYDROCHLORIDE 100 MG: 100 CAPSULE, LIQUID FILLED ORAL at 15:14

## 2023-08-24 RX ADMIN — CLINDAMYCIN IN 5 PERCENT DEXTROSE 600 MG: 12 INJECTION, SOLUTION INTRAVENOUS at 04:32

## 2023-08-24 RX ADMIN — CARVEDILOL 6.25 MG: 6.25 TABLET, FILM COATED ORAL at 08:27

## 2023-08-24 RX ADMIN — AMANTADINE HYDROCHLORIDE 100 MG: 100 CAPSULE, LIQUID FILLED ORAL at 08:26

## 2023-08-24 RX ADMIN — TIZANIDINE 4 MG: 4 TABLET ORAL at 08:27

## 2023-08-24 RX ADMIN — ROSUVASTATIN CALCIUM 20 MG: 20 TABLET, COATED ORAL at 08:27

## 2023-08-24 RX ADMIN — GABAPENTIN 300 MG: 300 CAPSULE ORAL at 08:26

## 2023-08-24 RX ADMIN — PANTOPRAZOLE SODIUM 40 MG: 40 TABLET, DELAYED RELEASE ORAL at 08:27

## 2023-08-24 ASSESSMENT — PAIN DESCRIPTION - DESCRIPTORS: DESCRIPTORS: ACHING;SORE

## 2023-08-24 ASSESSMENT — PAIN SCALES - GENERAL: PAINLEVEL_OUTOF10: 8

## 2023-08-24 ASSESSMENT — PAIN DESCRIPTION - ORIENTATION: ORIENTATION: MID

## 2023-08-24 ASSESSMENT — PAIN DESCRIPTION - LOCATION: LOCATION: BACK;MOUTH

## 2023-08-24 NOTE — DISCHARGE INSTR - DIET
Good nutrition is important when healing from an illness, injury, or surgery. Follow any nutrition recommendations given to you during your hospital stay. If you were given an oral nutrition supplement while in the hospital, continue to take this supplement at home. You can take it with meals, in-between meals, and/or before bedtime. These supplements can be purchased at most local grocery stores, pharmacies, and chain "[employer]-stores. If you have any questions about your diet or nutrition, call the hospital and ask for the dietitian. ADULT DIET;  Dysphagia - Minced and Moist   ADULT ORAL NUTRITION SUPPLEMENT; Breakfast, Lunch, Dinner; Diabetic Oral Supplement   ADULT ORAL NUTRITION SUPPLEMENT; Lunch, Dinner; Frozen Oral Supplement

## 2023-08-24 NOTE — CARE COORDINATION
Patient resides at [facility]. Patient can return.     Amberley  [phone]  Electronically signed by Edwardo Dance, RN, BSN on 8/24/2023 at 1:59 PM

## 2023-08-24 NOTE — PROGRESS NOTES
IV removed. Report called to Amie Sosa at [facility]. Discharge packet given to pt's wife. Per Dr. Lauren Saenz note, post op oral care instructions have been provided to pt/spouse, and sent to facility. Pt was assisted by nursing staff to dress in his own clothes. Transferred to his personal wheelchair with the lyft with direction/assist by Steve Eisenberg. Pt has his purple lyft pad under him in his chair for facility use on his arrival. Pt was then transported by nursing staff to front exit, where family was waiting with w/c accessible Nedra Pettit and will transport pt back to [facility].

## 2023-08-24 NOTE — DISCHARGE SUMMARY
[facility]    Discharge Summary      Mingo Granados  :  1970  MRN:  [id number]    Admit date:  2023  Discharge date:    2023    Discharging Physician:  Dr. Kapil Cummins Directive: Full Code    Consults: hospitalist    Primary Care Physician:  Jose Cooney    Discharge Diagnoses:  Principal Problem:    Decay, teeth  Resolved Problems:    * No resolved hospital problems. *      Portions of this note have been copied forward, however, changed to reflect the most current clinical status of this patient. Hospital Course: The patient is a 48 y.o. 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 Asa Shirley for which hospitalist were consulted for medical management. Patient was tachycardic and hypertensive this morning. Home medications resumed including Coreg and amlodipine with improvement in HR and blood pressure. Tachycardia was likely related to pain. He is currently tolerating a clear liquid diet with nutritional supplements. Leukocytosis has improved from 20.9 to 14.4 overnight. Plavix was held post-op due to bleeding, however resumed on day of discharge as bleeding has subsided. He was sent a prescription for norco & clindamycin through Dr. Brian paris at their office, so the nursing home has that script. Patient and nursing staff also have instructions for post extraction. Will continue current medications pt was on prior to being admitted. Labs & vitals stable. Patient is currently in stable condition to be discharged back to SNF. Significant Diagnostic Studies:   No results found.     Pertinent Labs:  CBC:   Recent Labs     23  0852 23  0221   WBC 20.9* 14.4*   HGB 15.1 14.9   PLT

## (undated) DEVICE — SYRINGE MED 10ML TRNSLUC BRL PLUNG BLK MRK POLYPR CTRL

## (undated) DEVICE — CURAVIEW LED LARYNSCP BLDE

## (undated) DEVICE — SOLUTION IV IRRIG POUR BRL 0.9% SODIUM CHL 2F7124

## (undated) DEVICE — INTENDED FOR TISSUE SEPARATION, AND OTHER PROCEDURES THAT REQUIRE A SHARP SURGICAL BLADE TO PUNCTURE OR CUT.: Brand: BARD-PARKER ® CARBON RIB-BACK BLADES

## (undated) DEVICE — SUTURE CHROMIC GUT SZ 3-0 L27IN ABSRB BRN L19MM FS-2 3/8 636H

## (undated) DEVICE — YANKAUER,TAPERED BULBOUS TIP,W/O VENT: Brand: MEDLINE

## (undated) DEVICE — Device

## (undated) DEVICE — STANDARD HYPODERMIC NEEDLE,POLYPROPYLENE HUB: Brand: MONOJECT

## (undated) DEVICE — SUTURE CHROMIC GUT SZ 3-0 L27IN ABSRB BRN L26MM SH 1/2 CIR G122H

## (undated) DEVICE — BUR SURG MED 2.4 MM

## (undated) DEVICE — MINOR CDS: Brand: MEDLINE INDUSTRIES, INC.

## (undated) DEVICE — SHEET,DRAPE,53X77,STERILE: Brand: MEDLINE

## (undated) DEVICE — TAPE UMBILICAL W1/16XL30IN COTTON ROUND NONRADIOPAQUE

## (undated) DEVICE — TUBE ET 7MM NSL ORAL BASIC CUF INTMED MURPHY EYE RADPQ MRK

## (undated) DEVICE — TOWEL,OR,DSP,ST,BLUE,DLX,4/PK,20PK/CS: Brand: MEDLINE

## (undated) DEVICE — GAUZE,SPONGE,4"X4",8PLY,STRL,LF,10/TRAY: Brand: MEDLINE